# Patient Record
Sex: FEMALE
[De-identification: names, ages, dates, MRNs, and addresses within clinical notes are randomized per-mention and may not be internally consistent; named-entity substitution may affect disease eponyms.]

---

## 2021-05-05 PROBLEM — Z00.00 ENCOUNTER FOR PREVENTIVE HEALTH EXAMINATION: Status: ACTIVE | Noted: 2021-05-05

## 2021-05-05 PROBLEM — C41.2 CHORDOMA: Status: ACTIVE | Noted: 2021-05-05

## 2021-05-06 ENCOUNTER — APPOINTMENT (OUTPATIENT)
Dept: NEUROSURGERY | Facility: CLINIC | Age: 51
End: 2021-05-06
Payer: COMMERCIAL

## 2021-05-06 VITALS
RESPIRATION RATE: 17 BRPM | SYSTOLIC BLOOD PRESSURE: 140 MMHG | BODY MASS INDEX: 24.75 KG/M2 | OXYGEN SATURATION: 99 % | TEMPERATURE: 98.7 F | WEIGHT: 145 LBS | HEIGHT: 64 IN | DIASTOLIC BLOOD PRESSURE: 64 MMHG | HEART RATE: 87 BPM

## 2021-05-06 VITALS — WEIGHT: 145 LBS | HEIGHT: 64 IN | BODY MASS INDEX: 24.75 KG/M2

## 2021-05-06 DIAGNOSIS — Z86.018 PERSONAL HISTORY OF OTHER BENIGN NEOPLASM: ICD-10-CM

## 2021-05-06 DIAGNOSIS — Z87.39 PERSONAL HISTORY OF OTHER DISEASES OF THE MUSCULOSKELETAL SYSTEM AND CONNECTIVE TISSUE: ICD-10-CM

## 2021-05-06 DIAGNOSIS — Z86.79 PERSONAL HISTORY OF OTHER DISEASES OF THE CIRCULATORY SYSTEM: ICD-10-CM

## 2021-05-06 DIAGNOSIS — Z82.3 FAMILY HISTORY OF STROKE: ICD-10-CM

## 2021-05-06 DIAGNOSIS — C41.2 MALIGNANT NEOPLASM OF VERTEBRAL COLUMN: ICD-10-CM

## 2021-05-06 DIAGNOSIS — Z78.9 OTHER SPECIFIED HEALTH STATUS: ICD-10-CM

## 2021-05-06 DIAGNOSIS — Z86.2 PERSONAL HISTORY OF DISEASES OF THE BLOOD AND BLOOD-FORMING ORGANS AND CERTAIN DISORDERS INVOLVING THE IMMUNE MECHANISM: ICD-10-CM

## 2021-05-06 DIAGNOSIS — Z77.22 CONTACT WITH AND (SUSPECTED) EXPOSURE TO ENVIRONMENTAL TOBACCO SMOKE (ACUTE) (CHRONIC): ICD-10-CM

## 2021-05-06 DIAGNOSIS — Z82.49 FAMILY HISTORY OF ISCHEMIC HEART DISEASE AND OTHER DISEASES OF THE CIRCULATORY SYSTEM: ICD-10-CM

## 2021-05-06 PROCEDURE — 99072 ADDL SUPL MATRL&STAF TM PHE: CPT

## 2021-05-06 PROCEDURE — 99205 OFFICE O/P NEW HI 60 MIN: CPT

## 2021-05-06 RX ORDER — SPIRONOLACTONE 25 MG
TABLET ORAL
Refills: 0 | Status: ACTIVE | COMMUNITY

## 2021-05-06 RX ORDER — CHROMIUM 200 MCG
TABLET ORAL
Refills: 0 | Status: ACTIVE | COMMUNITY

## 2021-05-06 RX ORDER — OMEGA-3/DHA/EPA/FISH OIL 300-1000MG
CAPSULE ORAL
Refills: 0 | Status: ACTIVE | COMMUNITY

## 2021-05-06 NOTE — HISTORY OF PRESENT ILLNESS
[FreeTextEntry1] : "Chordoma" [de-identified] : Emely Payne is a pleasant right handed 50 year lady who presents for consultation regarding sacral chordoma.  Pt reports a long standing history of non-specific lower back pain and she also noticed a protrusion in her lower back.  She then had a lower back x-ray at the end of March as ordered by her PCP.  Follow up MRI lower back that revealed a chordoma.  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

## 2021-05-06 NOTE — PHYSICAL EXAM
[General Appearance - Alert] : alert [General Appearance - In No Acute Distress] : in no acute distress [General Appearance - Well Nourished] : well nourished [General Appearance - Well Developed] : well developed [General Appearance - Well-Appearing] : healthy appearing [Oriented To Time, Place, And Person] : oriented to person, place, and time [Impaired Insight] : insight and judgment were intact [Affect] : the affect was normal [Memory Recent] : recent memory was not impaired [Person] : oriented to person [Place] : oriented to place [Time] : oriented to time [Cranial Nerves Optic (II)] : visual acuity intact bilaterally,  pupils equal round and reactive to light [Cranial Nerves Oculomotor (III)] : extraocular motion intact [Cranial Nerves Trigeminal (V)] : facial sensation intact symmetrically [Cranial Nerves Facial (VII)] : face symmetrical [Cranial Nerves Vestibulocochlear (VIII)] : hearing was intact bilaterally [Cranial Nerves Glossopharyngeal (IX)] : tongue and palate midline [Cranial Nerves Accessory (XI - Cranial And Spinal)] : head turning and shoulder shrug symmetric [Cranial Nerves Hypoglossal (XII)] : there was no tongue deviation with protrusion [Motor Tone] : muscle tone was normal in all four extremities [Motor Strength] : muscle strength was normal in all four extremities [Involuntary Movements] : no involuntary movements were seen [Abnormal Walk] : normal gait [Motor Handedness Right-Handed] : the patient is right hand dominant [Balance] : balance was intact [Sclera] : the sclera and conjunctiva were normal [PERRL With Normal Accommodation] : pupils were equal in size, round, reactive to light, with normal accommodation [Extraocular Movements] : extraocular movements were intact [Outer Ear] : the ears and nose were normal in appearance [Hearing Threshold Finger Rub Not [place]] : hearing was normal [Oropharynx] : the oropharynx was normal [Neck Appearance] : the appearance of the neck was normal [] : no respiratory distress [Heart Rate And Rhythm] : heart rate was normal and rhythm regular [Skin Color & Pigmentation] : normal skin color and pigmentation [Limited Balance] : balance was intact [FreeTextEntry1] : outpouching at tailbone area

## 2021-05-06 NOTE — REVIEW OF SYSTEMS
[Negative] : Heme/Lymph [Recent Weight Loss (___ Lbs)] : recent [unfilled] ~Ulb weight loss [Hand Weakness] :  hand weakness [Poor Coordination] : poor coordination [Abnormal Sensation] : an abnormal sensation [Tension Headache] : tension-type headaches [Abdominal Pain] : abdominal pain [Constipation] : constipation [Heartburn] : heartburn [Melena] : melrand [Dysuria] : dysuria [Dysmenorrhea] : dysmenorrhea [Vaginal Discharge] : vaginal discharge [Joint Swelling] : joint swelling [Joint Stiffness] : joint stiffness [FreeTextEntry2] : intentional weight loss [de-identified] : lower back pain [FreeTextEntry9] : arthritis

## 2021-05-06 NOTE — DATA REVIEWED
[de-identified] : CT CAP 4/27/21 [de-identified] : MRI TOTAL SPINE 4/17/21 [de-identified] : PET/CT NECK CAP 4/22/21

## 2021-05-06 NOTE — REASON FOR VISIT
[New Patient Visit] : a new patient visit [Referred By: _________] : Patient was referred by DEJA [Family Member] : family member

## 2021-05-06 NOTE — ASSESSMENT
[FreeTextEntry1] : IMPRESSION:\par 1. Large expansile sacrococcygeal chordoma.  Destructive multilobular high T2 signal intensity lesion in the mid sacrum to upper coccyx, 9.8 x 9.6 x 8.6 cm.\par \par \par PLAN:\par 1. En bloc surgery will affect bowel, bladder and sexual function. Discussed that post op she may possibly need  straight catheterization however bowel function may result in incontinence, need for digital stimulation or necessitate a colostomy, \par 2. Explained that radiation therapy will compromise normal soft tissue and lead to poor wound healing.  Location of incision adds to increased risk of slower wound healing.\par 3. Plastic Surgery will be involved in surgery for wound closure.\par 4. Will discuss with GYN oncology regarding surgery for myomectomy.\par 5. Discussed surgical flap to be obtained from either belly or gluteal area.\par 6. Risks or surgery discussed to include but are not limited to anesthesia risks, technical risks, vascular injury, nerve damage, wound issues,and even death among others.\par 7. All questions by pt and family/friends were answered to their satisfaction.  Pt will think about the recommendation for surgery and let us know.

## 2021-05-17 ENCOUNTER — NON-APPOINTMENT (OUTPATIENT)
Age: 51
End: 2021-05-17

## 2021-05-25 ENCOUNTER — TRANSCRIPTION ENCOUNTER (OUTPATIENT)
Age: 51
End: 2021-05-25